# Patient Record
Sex: MALE | Race: WHITE | NOT HISPANIC OR LATINO | Employment: FULL TIME | URBAN - METROPOLITAN AREA
[De-identification: names, ages, dates, MRNs, and addresses within clinical notes are randomized per-mention and may not be internally consistent; named-entity substitution may affect disease eponyms.]

---

## 2019-08-02 LAB — RAPID GROUP A STREP (OHS): NEGATIVE

## 2019-09-30 LAB — CRC RECOMMENDATION EXT: NORMAL

## 2020-10-26 ENCOUNTER — HISTORICAL (OUTPATIENT)
Dept: ADMINISTRATIVE | Facility: HOSPITAL | Age: 50
End: 2020-10-26

## 2021-05-12 ENCOUNTER — PATIENT MESSAGE (OUTPATIENT)
Dept: RESEARCH | Facility: HOSPITAL | Age: 51
End: 2021-05-12

## 2021-12-10 LAB
INFLUENZA A ANTIGEN, POC: NEGATIVE
INFLUENZA B ANTIGEN, POC: NEGATIVE
RAPID GROUP A STREP (OHS): POSITIVE
SARS-COV-2 RNA RESP QL NAA+PROBE: NEGATIVE

## 2022-05-03 NOTE — HISTORICAL [facility] CERNER
This is a historical note converted from [facility]. Formatting and pictures may have been removed.  Please reference [facility] for original formatting and attached multimedia. Chief Complaint  Left ring finger pain,swelling ?x 1 day. slammed finger on coffee cup.  History of Present Illness  50-year-old male who presents for evaluation of the left?fourth finger pain. ?The patient states that he held a coffee cup with his left hand?at the top of the cup and slammed the cup down yesterday.? He admits to pain with flexion of the left?fourth finger.  Review of Systems  Constitutional_no fever, fatigue, weakness  Musculoskeletal_negative except HPI  Integumentary_no skin rash or abnormal lesion  Neurologic_no headache, no dizziness, no weakness or numbness  ?  Physical Exam  Vitals & Measurements  T:?36.4? ?C (Oral)? HR:?77(Peripheral)? RR:?20? BP:?139/99? SpO2:?98%?  HT:?182.00?cm? WT:?119.200?kg? BMI:?35.99?  General_well-developed well-nourished in no acute distress  Musculoskeletal_left hand:?Normal range of motion,?2+ radial pulses on left,?normal capillary refill, normal sensation,?no visible swelling or ecchymoses, patient admits to pain with?fourth?finger flexion.? Normal range of motion of right hand.  Integumentary_no rashes or skin lesions present  Neurologic_ motor/sensory function intact  Assessment/Plan  Pain in finger of left hand?M79.645  ?No acute abnormality and fourth finger of left hand, my read, radiologist read pending  Patient will be notified of official results  May use Tylenol or NSAIDs as needed for pain such as ibuprofen  May use ice as needed  Monitor for worsening symptoms  Contact clinic for any concerns  Ordered:  XR Hand Left Minimum 3 Views, Routine, 10/26/20 9:53:00 CDT, None, Ambulatory, Rad Type, Pain in finger of left hand, Not Scheduled, 10/26/20 9:53:00 CDT  ?   Problem List/Past Medical History  Ongoing  Eczema  GERD - Gastro-esophageal reflux  disease  Hypercholesterolaemia  Hypertension  Hypothyroidism  Incarcerated umbilical hernia  Obesity  Historical  No qualifying data  Procedure/Surgical History  Laparoscopy, surgical, repair, ventral, umbilical, spigelian or epigastric hernia (includes mesh insertion, when performed); incarcerated or strangulated (06/25/2020)  Colonoscopy, flexible; diagnostic, including collection of specimen(s) by brushing or washing, when performed (separate procedure) (09/30/2019)  COLONOSCOPY (2019)  History of hernia repair  left shoulder reconstruction   Medications  acetaminophen/butalbital/caffeine 325 mg-50 mg-40 mg oral tablet, 1 tab(s), Oral, QID  amLODIPine 5 mg oral tablet, 5 mg= 1 tab(s), Oral, qPM  hydrochlorothiazide 12.5 mg oral tablet, 12.5 mg= 1 tab(s), Oral, Daily  levothyroxine 50 mcg (0.05 mg) oral tablet, 50 mcg= 1 tab(s), Oral, Daily  lisinopril 20 mg oral tablet, 20 mg= 1 tab(s), Oral, Daily  pravastatin 40 mg oral tablet, 40 mg= 1 tab(s), Oral, Daily  red yeast rice, 1 tab(s), Oral, Daily  Allergies  No Known Medication Allergies  Social History  Abuse/Neglect  No, 10/26/2020  No, 06/22/2020  Alcohol  Never, 06/22/2020  Nutrition/Health  Regular, 06/22/2020  Substance Use  Never, 06/22/2020  Tobacco  Former smoker, quit more than 30 days ago, No, 10/26/2020  Former smoker, quit more than 30 days ago, N/A, Smokeless Tobacco Use: Former smokeless tobacco user, quit more than 30 days ago., 06/22/2020  Family History  Acute myocardial infarction.: Father.  Hypertension.: Mother and Father.  Diagnostic Results  Left hand x-ray pending

## 2022-06-05 ENCOUNTER — OFFICE VISIT (OUTPATIENT)
Dept: URGENT CARE | Facility: CLINIC | Age: 52
End: 2022-06-05
Payer: COMMERCIAL

## 2022-06-05 VITALS
BODY MASS INDEX: 36.57 KG/M2 | HEART RATE: 87 BPM | HEIGHT: 72 IN | RESPIRATION RATE: 20 BRPM | SYSTOLIC BLOOD PRESSURE: 144 MMHG | WEIGHT: 270 LBS | TEMPERATURE: 98 F | OXYGEN SATURATION: 96 % | DIASTOLIC BLOOD PRESSURE: 99 MMHG

## 2022-06-05 DIAGNOSIS — R05.9 COUGH: Primary | ICD-10-CM

## 2022-06-05 DIAGNOSIS — J02.9 SORE THROAT: ICD-10-CM

## 2022-06-05 DIAGNOSIS — U07.1 COVID-19: ICD-10-CM

## 2022-06-05 DIAGNOSIS — U07.1 COVID-19 VIRUS DETECTED: ICD-10-CM

## 2022-06-05 LAB
CTP QC/QA: YES
FLUAV AG NPH QL: NEGATIVE
FLUBV AG NPH QL: NEGATIVE
S PYO RRNA THROAT QL PROBE: NEGATIVE
SARS-COV-2 RDRP RESP QL NAA+PROBE: POSITIVE

## 2022-06-05 PROCEDURE — U0002 COVID-19 LAB TEST NON-CDC: HCPCS | Mod: QW,,, | Performed by: NURSE PRACTITIONER

## 2022-06-05 PROCEDURE — 99213 OFFICE O/P EST LOW 20 MIN: CPT | Mod: 25,,, | Performed by: NURSE PRACTITIONER

## 2022-06-05 PROCEDURE — 3077F SYST BP >= 140 MM HG: CPT | Mod: CPTII,,, | Performed by: NURSE PRACTITIONER

## 2022-06-05 PROCEDURE — 99213 PR OFFICE/OUTPT VISIT, EST, LEVL III, 20-29 MIN: ICD-10-PCS | Mod: 25,,, | Performed by: NURSE PRACTITIONER

## 2022-06-05 PROCEDURE — U0002: ICD-10-PCS | Mod: QW,,, | Performed by: NURSE PRACTITIONER

## 2022-06-05 PROCEDURE — 3080F DIAST BP >= 90 MM HG: CPT | Mod: CPTII,,, | Performed by: NURSE PRACTITIONER

## 2022-06-05 PROCEDURE — 1159F PR MEDICATION LIST DOCUMENTED IN MEDICAL RECORD: ICD-10-PCS | Mod: CPTII,,, | Performed by: NURSE PRACTITIONER

## 2022-06-05 PROCEDURE — 87880 POCT RAPID STREP A: ICD-10-PCS | Mod: QW,,, | Performed by: NURSE PRACTITIONER

## 2022-06-05 PROCEDURE — 1159F MED LIST DOCD IN RCRD: CPT | Mod: CPTII,,, | Performed by: NURSE PRACTITIONER

## 2022-06-05 PROCEDURE — 4010F ACE/ARB THERAPY RXD/TAKEN: CPT | Mod: CPTII,,, | Performed by: NURSE PRACTITIONER

## 2022-06-05 PROCEDURE — 87880 STREP A ASSAY W/OPTIC: CPT | Mod: QW,,, | Performed by: NURSE PRACTITIONER

## 2022-06-05 PROCEDURE — 1160F PR REVIEW ALL MEDS BY PRESCRIBER/CLIN PHARMACIST DOCUMENTED: ICD-10-PCS | Mod: CPTII,,, | Performed by: NURSE PRACTITIONER

## 2022-06-05 PROCEDURE — 87804 POCT INFLUENZA A/B: ICD-10-PCS | Mod: 59,QW,, | Performed by: NURSE PRACTITIONER

## 2022-06-05 PROCEDURE — 4010F PR ACE/ARB THEARPY RXD/TAKEN: ICD-10-PCS | Mod: CPTII,,, | Performed by: NURSE PRACTITIONER

## 2022-06-05 PROCEDURE — 3008F PR BODY MASS INDEX (BMI) DOCUMENTED: ICD-10-PCS | Mod: CPTII,,, | Performed by: NURSE PRACTITIONER

## 2022-06-05 PROCEDURE — 87804 INFLUENZA ASSAY W/OPTIC: CPT | Mod: QW,,, | Performed by: NURSE PRACTITIONER

## 2022-06-05 PROCEDURE — 1160F RVW MEDS BY RX/DR IN RCRD: CPT | Mod: CPTII,,, | Performed by: NURSE PRACTITIONER

## 2022-06-05 PROCEDURE — 3008F BODY MASS INDEX DOCD: CPT | Mod: CPTII,,, | Performed by: NURSE PRACTITIONER

## 2022-06-05 PROCEDURE — 3077F PR MOST RECENT SYSTOLIC BLOOD PRESSURE >= 140 MM HG: ICD-10-PCS | Mod: CPTII,,, | Performed by: NURSE PRACTITIONER

## 2022-06-05 PROCEDURE — 3080F PR MOST RECENT DIASTOLIC BLOOD PRESSURE >= 90 MM HG: ICD-10-PCS | Mod: CPTII,,, | Performed by: NURSE PRACTITIONER

## 2022-06-05 RX ORDER — AMLODIPINE BESYLATE 5 MG/1
5 TABLET ORAL DAILY
COMMUNITY
Start: 2022-03-12

## 2022-06-05 RX ORDER — LISINOPRIL 20 MG/1
20 TABLET ORAL DAILY
COMMUNITY
Start: 2022-03-12

## 2022-06-05 RX ORDER — LEVOTHYROXINE SODIUM 100 UG/1
100 TABLET ORAL DAILY
COMMUNITY
Start: 2022-05-05

## 2022-06-05 RX ORDER — TRIAMCINOLONE ACETONIDE 1 MG/G
CREAM TOPICAL
COMMUNITY
Start: 2022-04-05

## 2022-06-05 RX ORDER — HYDROCHLOROTHIAZIDE 25 MG/1
25 TABLET ORAL
COMMUNITY
Start: 2021-12-10

## 2022-06-05 RX ORDER — PRAVASTATIN SODIUM 40 MG/1
40 TABLET ORAL DAILY
COMMUNITY
Start: 2022-04-05

## 2022-06-05 NOTE — PROGRESS NOTES
Subjective:       Patient ID: Abel La is a 51 y.o. male.    Vitals:  height is 6' (1.829 m) and weight is 122.5 kg (270 lb). His oral temperature is 98.3 °F (36.8 °C). His blood pressure is 144/99 (abnormal) and his pulse is 87. His respiration is 20 and oxygen saturation is 96%.     Chief Complaint: Cough (Cough, bodyaches, sore throat X3 days )    Cough, bodyaches, sore throat X3 days     Cough  This is a new problem. The current episode started in the past 7 days. The problem has been unchanged. The cough is non-productive. Associated symptoms include a fever. Nothing aggravates the symptoms. He has tried OTC cough suppressant for the symptoms. The treatment provided no relief.       Constitution: Positive for fever.   Respiratory: Positive for cough.        Objective:      Physical Exam   Constitutional: He is oriented to person, place, and time. He appears well-developed. He is cooperative.  Non-toxic appearance. He does not appear ill. No distress.   HENT:   Head: Normocephalic and atraumatic.   Ears:   Right Ear: Hearing, tympanic membrane, external ear and ear canal normal.   Left Ear: Hearing, tympanic membrane, external ear and ear canal normal.   Nose: Nose normal. No mucosal edema, rhinorrhea or nasal deformity. No epistaxis. Right sinus exhibits no maxillary sinus tenderness and no frontal sinus tenderness. Left sinus exhibits no maxillary sinus tenderness and no frontal sinus tenderness.   Mouth/Throat: Uvula is midline, oropharynx is clear and moist and mucous membranes are normal. No trismus in the jaw. Normal dentition. No uvula swelling. No oropharyngeal exudate, posterior oropharyngeal edema or posterior oropharyngeal erythema.   Eyes: Conjunctivae and lids are normal. No scleral icterus.   Neck: Trachea normal and phonation normal. Neck supple. No edema present. No erythema present. No neck rigidity present.   Cardiovascular: Normal rate, regular rhythm, normal heart sounds and normal  pulses.   Pulmonary/Chest: Effort normal and breath sounds normal. No respiratory distress. He has no decreased breath sounds. He has no rhonchi.   Abdominal: Normal appearance.   Musculoskeletal: Normal range of motion.         General: No deformity. Normal range of motion.   Neurological: He is alert and oriented to person, place, and time. He exhibits normal muscle tone. Coordination normal.   Skin: Skin is warm, dry, intact, not diaphoretic and not pale.   Psychiatric: His speech is normal and behavior is normal. Judgment and thought content normal.   Nursing note and vitals reviewed.        Assessment:       1. Cough    2. Sore throat    3. COVID-19          Plan:     Take Mucinex as directed for cough.    Increase oral fluids, take daily vitamins as directed, Self quarantine for 5 days.    Go to ER for worsening symptoms including shortness of breath, fever, or worsening symptoms.    POSITIVE COVID TEST    You have tested positive for COVID-19 today.  Please note that patients who test positive for COVID-19 are required by the CDC to undergo isolation for 5 days after their symptoms first began.    This isolation starts from the day you first developed symptoms, not the day of your positive test.     However, if you are asymptomatic (a person who does not have any symptoms) and COVID-19 positive, your 5-day isolation begins on the day you tested positive, regardless of exposure date.    Also, per the CDC guidelines, once your 5 days have passed, and you have not had fever greater than 100.4F in the last 24 hours without taking any fever reducers such as Tylenol (Acetaminophen) or Motrin (Ibuprofen), you may return to your normal activities including social distancing, wearing masks, and frequent handwashing - YOU DO NOT NEED ANOTHER TEST IN ORDER TO END YOUR QUARANTINE.    Cough  -     POCT COVID-19 Rapid Screening  -     POCT rapid strep A  -     POCT Influenza A/B    Sore throat  -     POCT rapid strep  A    COVID-19

## 2022-08-26 ENCOUNTER — DOCUMENTATION ONLY (OUTPATIENT)
Dept: ADMINISTRATIVE | Facility: HOSPITAL | Age: 52
End: 2022-08-26
Payer: COMMERCIAL

## 2022-09-17 ENCOUNTER — HISTORICAL (OUTPATIENT)
Dept: ADMINISTRATIVE | Facility: HOSPITAL | Age: 52
End: 2022-09-17
Payer: COMMERCIAL